# Patient Record
Sex: MALE | Race: BLACK OR AFRICAN AMERICAN | URBAN - METROPOLITAN AREA
[De-identification: names, ages, dates, MRNs, and addresses within clinical notes are randomized per-mention and may not be internally consistent; named-entity substitution may affect disease eponyms.]

---

## 2017-06-29 PROBLEM — 86406008 HUMAN IMMUNODEFICIENCY VIRUS INFECTION: Status: ACTIVE | Noted: 2017-06-29

## 2017-07-27 PROBLEM — 428283002 HISTORY OF POLYP OF COLON (SITUATION): Status: ACTIVE | Noted: 2017-07-27

## 2021-09-19 ENCOUNTER — TELEPHONE ENCOUNTER (OUTPATIENT)
Dept: URBAN - METROPOLITAN AREA CLINIC 92 | Facility: CLINIC | Age: 66
End: 2021-09-19

## 2021-09-19 NOTE — HPI-TODAY'S VISIT:
Patient is a 65-year-old male referred by  for evaluation of abnormal liver enzymes. A copy of the note will be sent to the referring provider. We did see records from September 13 from his local provider that he had some edema in his hands and had gone in for that.  BMI was noted to be 32.68 with a weight of 241 and a height of 72 inches.  Patient had mention some early satiety that they thought could be related to gastroparesis or fluid overload and they were getting a work-up of that.  Patient also noted to have diabetes with A1c running 8.5 and seeing a local endocrinologist for this.  He had some edema issues which he had been on furosemide but has been off of it for a week and so that was reordered.  He had elevated triglycerides that were downtrending from 208 to 179 with an LDL that was running 73 down from 121 on TriCor and atorvastatin for that.  This may be adding to the fatty liver.  Fatty liver also suspected as well.  He has chronic kidney disease stage III 8 with some estimated GFR in the 57 range and is avoiding nonsteroidals and has some proteinuria issues as well.   Recent laboratories listed included a glucose of 177 BUN of 14 creatinine 1.25 sodium 144 potassium 4.2 albumin 4.2 bilirubin 0.4 alkaline phosphatase 91 AST 42 and ALT 42.  Prior laboratories show glucose 159 BUN of 16 creatinine 1.35 sodium 142 potassium 4.6 AST 31 ALT 33 alk phos 59 total bili 0.6 W count 5.9 hemoglobin 13.7 plate count 135 which is diminished MCV 90.1 neutrophils 2605 lymphocytes 2561. March 2021 TSH 2.330 with works count 5.1 hemoglobin 14.2 plate count 148 MCV 95 vitamin D level was low at 23.4 cholesterol 141 triglycerides 205 HDL 35 LDL 72 AST was 46 ALT 49 alkaline phosphatase 82 bilirubin 0.3 Ultrasound done November 9, 2020 showed increased echogenicity of the liver consistent with fatty infiltration with exam limited by body habitus.  Pancreas not well seen due to gas.  No bile duct dilation.  Common bile duct not well seen kidneys with no hydronephrosis right kidney 10.9 and left 11.3. Recent labs showed magnesium 250 mg a day, diazepam 45 mg at night, atorvastatin 40 mg a day, losartan 25 mg a day, metoprolol 25 mg half a tablet orally in the evening, ferrous sulfate 28 mg once a day, Basaglar pen 48 units twice a day, Isentress 400 mg twice a day, aspirin 81 mils a day, Epzicom 1 1 daily, metoprolol 50 mg once a day, omega-3 fatty acids 500 mg twice a day, green source caplets twice a day, lansoprazole 30 mg once a day, duloxetine 60 mg twice a day, TriCor 145 minutes a day, furosemide 40 mg a day, Atrovent solution 2 sprays to the nares every 8 hours, levocetirizine 5 mg at night as needed, Ozempic 2 mg weekly, hydrocodone acetaminophen 5/325 1 every 12 hours as needed Benzonatate 100 mg at night as needed, Abilify 5 mg once a day. Past medical history includes the abdomen liver enzymes, fatty liver, type 2 diabetes, hyperlipidemia, mood disorder, GERD, vitamin D deficiency, sleep apnea, low testosterone syndrome, A. fib, peripheral disease, right hip osteoarthritis, thrombocytopenia.   Allergies MSG and shellfish 1. Abdomen enzymes noted and patient with prior November last year ultrasound limited suggesting fatty liver. Needs updated imaging as well as updated labs. 61 hep C antibody WBC other screens. Need to complete screens. Patient is working on diabetes but still with elevated A1c in the mid eight range. Patient also with weight issues as well. Patient also with hypertriglyceridemia as well as hyperlipidemia but apparently doing better on those which is good to note. Need to follow trends of his labs and see how doing. There did appear to be an increasing trend recently and is unclear if it is related to herbal medicine usage and that needs to be followed up on as well. Would encourage patient avoid any green tea or any immune stimulant herbals and then reassess course from there. Needs to continue to work on optimizing weight optimizing exercise 150 to 300 minutes a week as well as a treating all other potential causes of this. 2. Fatty liver noted by imaging. Certainly has risk factors for this with the hyperlipidemia, the weight, triglyceride issues, elevated A1c, and needs to continue to work on same. Need to get a better imaging but will start with ultrasound since he has not had one in a year and if not optimal get MRI and then reassess looking for fibrosis as another issue as well. Unclear why the numbers worsen some recently and need to rule out herbal role. 3. Hyperlipidemia on treatment as per team. Defer to primary care doctor. Apparently dropping. 4. Hypertriglyceridemia responding also to the fish oil and monitor accordingly. 5. Use of high-risk meds being monitored by local team. 6. Vaccine status needs recheck for hepatitis AMB immunity if negative consider for vaccination. He has been encouraged to have COVID-19 vaccine if not already obtained. 7. Vitamin D deficiency on supplementation as per team. 8. Low testosterone as per primary care team. 9. Peripheral artery disease as per team. Having lipid issues addressed. 10. Atrial fibrillation as per team on beta-blockers for same. 11. Mood disorder history noted as per team. Certainly duloxetine can increase liver enzymes in some patients and something to keep in mind. Certainly higher risk also for that particular if the patient has weight issues noted. 12. Diabetes needs to continue to optimize his hemoglobin A1c in the 8.5 range that needs to continue to be worked on accordingly. Plan 1.  _update labs. 2.  Check for hepatitis A/B immunity. 3.  _update imaging 4.  Encourage weight loss as well as exercise 150 to 300 minutes a week 5.  May need MRI pending ultrasound as was very limited last time. 6.  Avoid any green vitamins as they may potentially contain green tea which is potentially oral agent to raise liver enzyme

## 2021-09-29 ENCOUNTER — LAB OUTSIDE AN ENCOUNTER (OUTPATIENT)
Dept: URBAN - METROPOLITAN AREA TELEHEALTH 2 | Facility: TELEHEALTH | Age: 66
End: 2021-09-29

## 2021-09-29 ENCOUNTER — OFFICE VISIT (OUTPATIENT)
Dept: URBAN - METROPOLITAN AREA TELEHEALTH 2 | Facility: TELEHEALTH | Age: 66
End: 2021-09-29
Payer: OTHER GOVERNMENT

## 2021-09-29 ENCOUNTER — TELEPHONE ENCOUNTER (OUTPATIENT)
Dept: URBAN - METROPOLITAN AREA CLINIC 92 | Facility: CLINIC | Age: 66
End: 2021-09-29

## 2021-09-29 DIAGNOSIS — R74.8 ABNORMAL LIVER ENZYMES: ICD-10-CM

## 2021-09-29 DIAGNOSIS — Z79.899 HIGH RISK MEDICATION USE: ICD-10-CM

## 2021-09-29 DIAGNOSIS — I73.9 PERIPHERAL ARTERIAL DISEASE: ICD-10-CM

## 2021-09-29 DIAGNOSIS — E66.3 OVERWEIGHT: ICD-10-CM

## 2021-09-29 DIAGNOSIS — E78.1 HYPERTRIGLYCERIDEMIA: ICD-10-CM

## 2021-09-29 DIAGNOSIS — E55.9 VITAMIN D DEFICIENCY: ICD-10-CM

## 2021-09-29 DIAGNOSIS — E34.9 LOW TESTOSTERONE: ICD-10-CM

## 2021-09-29 DIAGNOSIS — Z71.89 VACCINE COUNSELING: ICD-10-CM

## 2021-09-29 DIAGNOSIS — E78.0 HYPERLIPIDEMIA: ICD-10-CM

## 2021-09-29 DIAGNOSIS — K76.0 FATTY LIVER: ICD-10-CM

## 2021-09-29 DIAGNOSIS — I48.91 ATRIAL FIBRILLATION: ICD-10-CM

## 2021-09-29 DIAGNOSIS — M16.9 HIP OSTEOARTHRITIS: ICD-10-CM

## 2021-09-29 PROBLEM — 238131007: Status: ACTIVE | Noted: 2021-09-19

## 2021-09-29 PROBLEM — 131078003 LOW TESTOSTERONE: Status: ACTIVE | Noted: 2021-09-19

## 2021-09-29 PROBLEM — 34713006 VITAMIN D DEFICIENCY: Status: ACTIVE | Noted: 2021-09-19

## 2021-09-29 PROBLEM — 55822004 HYPERLIPIDEMIA: Status: ACTIVE | Noted: 2021-09-19

## 2021-09-29 PROBLEM — 46206005 MOOD DISORDER: Status: ACTIVE | Noted: 2021-09-19

## 2021-09-29 PROBLEM — 313436004 TYPE II DIABETES MELLITUS WITHOUT COMPLICATION: Status: ACTIVE | Noted: 2021-09-19

## 2021-09-29 PROBLEM — 302870006 HYPERTRIGLYCERIDEMIA: Status: ACTIVE | Noted: 2021-09-19

## 2021-09-29 PROBLEM — 239872002 OSTEOARTHRITIS OF HIP: Status: ACTIVE | Noted: 2021-09-19

## 2021-09-29 PROBLEM — 49436004 ATRIAL FIBRILLATION: Status: ACTIVE | Noted: 2021-09-19

## 2021-09-29 PROBLEM — 840580004 PERIPHERAL ARTERIAL DISEASE: Status: ACTIVE | Noted: 2021-09-19

## 2021-09-29 PROCEDURE — 99204 OFFICE O/P NEW MOD 45 MIN: CPT

## 2021-09-29 RX ORDER — FENOFIBRATE 145 MG/1
1 TABLET TABLET ORAL ONCE A DAY
Status: ACTIVE | COMMUNITY

## 2021-09-29 RX ORDER — HYDROCODONE BITARTRATE AND ACETAMINOPHEN 5; 325 MG/1; MG/1
1 TABLET AS NEEDED TABLET ORAL QD PRN
Status: ACTIVE | COMMUNITY

## 2021-09-29 RX ORDER — SEMAGLUTIDE 1.34 MG/ML
AS DIRECTED INJECTION, SOLUTION SUBCUTANEOUS
Status: ACTIVE | COMMUNITY

## 2021-09-29 RX ORDER — RALTEGRAVIR 400 MG/1
1 TABLET TABLET, FILM COATED ORAL TWICE A DAY
Status: ACTIVE | COMMUNITY

## 2021-09-29 RX ORDER — LANSOPRAZOLE 30 MG/1
1 CAPSULE BEFORE A MEAL CAPSULE, DELAYED RELEASE ORAL ONCE A DAY
Status: ACTIVE | COMMUNITY

## 2021-09-29 RX ORDER — LEVOCETIRIZINE DIHYDROCHLORIDE 5 MG/1
1 TABLET IN THE EVENING TABLET, FILM COATED ORAL
Status: ACTIVE | COMMUNITY

## 2021-09-29 RX ORDER — PERPHENAZINE 2 MG
AS DIRECTED TABLET ORAL BID
Status: ACTIVE | COMMUNITY

## 2021-09-29 RX ORDER — MIRTAZAPINE 45 MG/1
1 TABLET AT BEDTIME TABLET, FILM COATED ORAL ONCE A DAY
Status: ACTIVE | COMMUNITY

## 2021-09-29 RX ORDER — ATORVASTATIN CALCIUM 40 MG/1
1 TABLET TABLET, FILM COATED ORAL ONCE A DAY
Status: ACTIVE | COMMUNITY

## 2021-09-29 RX ORDER — LOSARTAN POTASSIUM 25 MG/1
1 TABLET TABLET ORAL ONCE A DAY
Status: ACTIVE | COMMUNITY

## 2021-09-29 RX ORDER — PNV NO.95/FERROUS FUM/FOLIC AC 28MG-0.8MG
1 TABLET WITH A MEAL TABLET ORAL ONCE A DAY
Status: ACTIVE | COMMUNITY

## 2021-09-29 RX ORDER — ABACAVIR SULFATE AND LAMIVUDINE 600; 300 MG/1; MG/1
1 TABLET TABLET, FILM COATED ORAL ONCE A DAY
Status: ACTIVE | COMMUNITY

## 2021-09-29 RX ORDER — FUROSEMIDE 40 MG/1
1 TABLET TABLET ORAL ONCE A DAY
Status: ACTIVE | COMMUNITY

## 2021-09-29 RX ORDER — METOPROLOL SUCCINATE 25 MG/1
1 TABLET TABLET, FILM COATED, EXTENDED RELEASE ORAL ONCE A DAY
Status: ACTIVE | COMMUNITY

## 2021-09-29 RX ORDER — DULOXETINE 60 MG/1
1 CAPSULE CAPSULE, DELAYED RELEASE PELLETS ORAL TWICE A DAY
Status: ACTIVE | COMMUNITY

## 2021-09-29 RX ORDER — INSULIN GLARGINE 100 [IU]/ML
48 UNITS INJECTION, SOLUTION SUBCUTANEOUS BID
Status: ACTIVE | COMMUNITY

## 2021-09-29 NOTE — HPI-TODAY'S VISIT:
Patient is a 65-year-old male referred by  for evaluation of abnormal liver enzymesand fatty liver.  A copy of the note will be sent to the referring provider.  We did see records:  from September 13 from his local provider that he had some edema in his hands and had gone in for that.  BMI was noted to be 32.68 with a weight of 241 and a height of 72 inches.  Pt says this is his biggest weight in while and prior was 226 and up to this 241 with pandemic.  Patient had mention some early satiety that they thought could be related to gastroparesis or fluid overload and they were getting a work-up of that. He has not yet seen a Gi for that issue and he says that is not better.  Patient also noted to have diabetes with A1c running 8.5 and seeing a local endocrinologist for this.  He sees Dr Leona Adams and she works at [facility].  He had some edema issues which he had been on furosemide but has been off of it for a week and so that was reordered.  That helped the edema.  He had elevated triglycerides that were downtrending from 208 to 179 with an LDL that was running 73 down from 121 on TriCor and atorvastatin for that.  Lipids when off may be adding to the fatty liver.  Tx of the lipids can make that better.  Fatty liver also suspected as well and he says was 2 yrs.  He has chronic kidney disease stage III  and sees kidney team for this with some estimated GFR in the 57 range and is avoiding nonsteroidals and has some proteinuria issues as well.    Recent laboratories listed included a glucose of 177 BUN of 14 creatinine 1.25 sodium 144 potassium 4.2 albumin 4.2 bilirubin 0.4 alkaline phosphatase 91 AST 42 and ALT 42.  Ideal for him  is less than 35.  Prior laboratories show glucose 159 BUN of 16 creatinine 1.35 sodium 142 potassium 4.6 AST 31 ALT 33 alk phos 59 total bili 0.6 Wbc count 5.9 hemoglobin 13.7 plate count 135 which is diminished MCV 90.1 neutrophils 2605 lymphocytes 2561. That hints if he lost the weight that he would do better.  March 2021 TSH 2.330 with works count 5.1 hemoglobin 14.2 plate count 148 MCV 95 vitamin D level was low at 23.4 cholesterol 141 triglycerides 205 HDL 35 LDL 72 AST was 46 ALT 49 alkaline phosphatase 82 bilirubin 0.3 He does not recall why labs  worse then but was trying to lose some.  Ultrasound done November 9, 2020 showed increased echogenicity of the liver consistent with fatty infiltration with exam limited by body habitus.  Pancreas not well seen due to gas.  No bile duct dilation.  Common bile duct not well seen kidneys with no hydronephrosis right kidney 10.9 and left 11.3.  He lives near [facility].  Recent Meds showed magnesium 250 mg a day, mirtazepine 45 mg at night, atorvastatin 40 mg a day, losartan 25 mg a day, metoprolol 25 mg half a tablet orally in the evening, ferrous sulfate 28 mg once a day, Basaglar pen 48 units twice a day, Isentress 400 mg twice a day, aspirin 81 mils a day, Epzicom 1 1 daily, metoprolol 50 mg once a day, omega-3 fatty acids 500 mg twice a day, green source caplets twice a day, lansoprazole 30 mg once a day, duloxetine 60 mg twice a day, TriCor 145 minutes a day, furosemide 40 mg a day, Atrovent solution 2 sprays to the nares every 8 hours, levocetirizine 5 mg at night as needed, Ozempic 2 mg weekly, hydrocodone acetaminophen 5/325 1 every 12 hours as needed Benzonatate 100 mg at night as needed, Abilify 5 mg once a day.  I asked re the green source tablets and that is a supplement and he started to take for while.  Asked if any green tea in that and he does not know as that can raise lfts. he is off that for now.  Past medical history includes the abdomen liver enzymes, fatty liver, type 2 diabetes, hyperlipidemia, mood disorder, GERD, vitamin D deficiency, sleep apnea, low testosterone syndrome, A. fib, peripheral disease, right hip osteoarthritis, thrombocytopenia.   Allergies MSG and shellfish  Plan 1.  Need to update labs. Complete screens. 2.  Check for hepatitis A/B immunity. 3.  Need to update imaging with  u.s near [facility]. 4.  Encourage weight loss as working on this and as well as exercise 150 to 300 minutes a week. encouraged him to walk or exericise as can for 30 min, 5.  May need MRI pending ultrasound as was very limited last time. 6.  Avoid any green vitamins for now as many may potentially contain green tea which is potentially oral agent to raise liver enzyme.  Stressed to pt the need for social distancing and strict handwashing and wearing a mask and to follow any other new or added CDC recommendations as this is an evolving target.  Duration of the visit was 50 minutes with 20 minutes of chart prep and reviewing local records and then 30 min from [id number] am today by clock for this telemed visit as internet clock fluxed and with time spent reviewing recent records current status and future plans for the patient.

## 2021-10-04 ENCOUNTER — TELEPHONE ENCOUNTER (OUTPATIENT)
Dept: URBAN - METROPOLITAN AREA CLINIC 96 | Facility: CLINIC | Age: 66
End: 2021-10-04

## 2021-10-06 ENCOUNTER — LAB OUTSIDE AN ENCOUNTER (OUTPATIENT)
Dept: URBAN - METROPOLITAN AREA CLINIC 96 | Facility: CLINIC | Age: 66
End: 2021-10-06

## 2021-10-06 PROBLEM — 409063005 COUNSELING: Status: ACTIVE | Noted: 2021-09-19

## 2021-10-06 PROBLEM — 161646004 H/O: HIGH RISK MEDICATION: Status: ACTIVE | Noted: 2021-09-19

## 2021-10-06 PROBLEM — 302215000 THROMBOCYTOPENIA: Status: ACTIVE | Noted: 2021-09-19

## 2021-10-14 PROBLEM — 55822004 HYPERLIPIDAEMIA: Status: ACTIVE | Noted: 2021-10-14

## 2021-10-14 PROBLEM — 166643006 LIVER ENZYMES ABNORMAL: Status: ACTIVE | Noted: 2021-09-19

## 2021-10-14 PROBLEM — 197321007 FATTY LIVER: Status: ACTIVE | Noted: 2021-09-19

## 2021-11-12 ENCOUNTER — OFFICE VISIT (OUTPATIENT)
Dept: URBAN - METROPOLITAN AREA CLINIC 81 | Facility: CLINIC | Age: 66
End: 2021-11-12
Payer: OTHER GOVERNMENT

## 2021-11-12 DIAGNOSIS — K76.0 FATTY (CHANGE OF) LIVER: ICD-10-CM

## 2021-11-12 PROCEDURE — 76705 ECHO EXAM OF ABDOMEN: CPT

## 2021-11-12 PROCEDURE — 93975 VASCULAR STUDY: CPT

## 2021-11-16 ENCOUNTER — TELEPHONE ENCOUNTER (OUTPATIENT)
Dept: URBAN - METROPOLITAN AREA CLINIC 92 | Facility: CLINIC | Age: 66
End: 2021-11-16

## 2021-11-16 NOTE — HPI-TODAY'S VISIT:
Dillon Gonzalez, November 12 ultrasound shows the partially visualized pancreas to be unremarkable where seen. The liver has diffuse increased echogenicity compatible with fatty infiltration but no lesions seen. Gallbladder is thin-walled but without any stones or signs of inflammation.  Common bile duct was normal at 5 mm. Right kidney 11.2 cm with no hydronephrosis. Spleen normal at 9.6 cm. Liver vessels show normal flow. Overall they felt that you had a fatty appearing liver. Dr. Herrera

## 2022-01-24 ENCOUNTER — OFFICE VISIT (OUTPATIENT)
Dept: URBAN - METROPOLITAN AREA TELEHEALTH 2 | Facility: TELEHEALTH | Age: 67
End: 2022-01-24

## 2023-01-03 ENCOUNTER — TELEPHONE ENCOUNTER (OUTPATIENT)
Dept: URBAN - METROPOLITAN AREA CLINIC 92 | Facility: CLINIC | Age: 68
End: 2023-01-03

## 2023-01-03 ENCOUNTER — WEB ENCOUNTER (OUTPATIENT)
Dept: URBAN - METROPOLITAN AREA CLINIC 92 | Facility: CLINIC | Age: 68
End: 2023-01-03

## 2023-01-03 ENCOUNTER — OFFICE VISIT (OUTPATIENT)
Dept: URBAN - METROPOLITAN AREA CLINIC 92 | Facility: CLINIC | Age: 68
End: 2023-01-03
Payer: OTHER GOVERNMENT

## 2023-01-03 ENCOUNTER — LAB OUTSIDE AN ENCOUNTER (OUTPATIENT)
Dept: URBAN - METROPOLITAN AREA CLINIC 92 | Facility: CLINIC | Age: 68
End: 2023-01-03

## 2023-01-03 VITALS
BODY MASS INDEX: 32.29 KG/M2 | TEMPERATURE: 97.6 F | HEIGHT: 72 IN | SYSTOLIC BLOOD PRESSURE: 113 MMHG | HEART RATE: 70 BPM | DIASTOLIC BLOOD PRESSURE: 71 MMHG | WEIGHT: 238.4 LBS

## 2023-01-03 DIAGNOSIS — R19.7 DIARRHEA: ICD-10-CM

## 2023-01-03 DIAGNOSIS — R15.9 FULL INCONTINENCE OF FECES: ICD-10-CM

## 2023-01-03 PROBLEM — 72042002 INCONTINENCE OF FECES: Status: ACTIVE | Noted: 2017-06-29

## 2023-01-03 PROCEDURE — 99214 OFFICE O/P EST MOD 30 MIN: CPT | Performed by: INTERNAL MEDICINE

## 2023-01-03 RX ORDER — SEMAGLUTIDE 1.34 MG/ML
AS DIRECTED INJECTION, SOLUTION SUBCUTANEOUS
Status: ACTIVE | COMMUNITY

## 2023-01-03 RX ORDER — MIRTAZAPINE 45 MG/1
1 TABLET AT BEDTIME TABLET, FILM COATED ORAL ONCE A DAY
Status: ON HOLD | COMMUNITY

## 2023-01-03 RX ORDER — ATORVASTATIN CALCIUM 40 MG/1
1 TABLET TABLET, FILM COATED ORAL ONCE A DAY
Status: ACTIVE | COMMUNITY

## 2023-01-03 RX ORDER — FUROSEMIDE 40 MG/1
1 TABLET TABLET ORAL ONCE A DAY
Status: ON HOLD | COMMUNITY

## 2023-01-03 RX ORDER — METOPROLOL SUCCINATE 25 MG/1
1 TABLET TABLET, FILM COATED, EXTENDED RELEASE ORAL ONCE A DAY
Status: ACTIVE | COMMUNITY

## 2023-01-03 RX ORDER — RALTEGRAVIR 400 MG/1
1 TABLET TABLET, FILM COATED ORAL TWICE A DAY
Status: ON HOLD | COMMUNITY

## 2023-01-03 RX ORDER — HYDROCODONE BITARTRATE AND ACETAMINOPHEN 5; 325 MG/1; MG/1
1 TABLET AS NEEDED TABLET ORAL QD PRN
Status: ON HOLD | COMMUNITY

## 2023-01-03 RX ORDER — LEVOCETIRIZINE DIHYDROCHLORIDE 5 MG/1
1 TABLET IN THE EVENING TABLET, FILM COATED ORAL
Status: ACTIVE | COMMUNITY

## 2023-01-03 RX ORDER — SODIUM, POTASSIUM,MAG SULFATES 17.5-3.13G
177ML SOLUTION, RECONSTITUTED, ORAL ORAL
Qty: 2 | Refills: 0 | OUTPATIENT
Start: 2023-01-03 | End: 2023-01-05

## 2023-01-03 RX ORDER — LOSARTAN POTASSIUM 25 MG/1
1 TABLET TABLET ORAL ONCE A DAY
Status: ON HOLD | COMMUNITY

## 2023-01-03 RX ORDER — LANSOPRAZOLE 30 MG/1
1 CAPSULE BEFORE A MEAL CAPSULE, DELAYED RELEASE ORAL ONCE A DAY
Status: ON HOLD | COMMUNITY

## 2023-01-03 RX ORDER — PERPHENAZINE 2 MG
AS DIRECTED TABLET ORAL BID
Status: ACTIVE | COMMUNITY

## 2023-01-03 RX ORDER — PNV NO.95/FERROUS FUM/FOLIC AC 28MG-0.8MG
1 TABLET WITH A MEAL TABLET ORAL ONCE A DAY
Status: ACTIVE | COMMUNITY

## 2023-01-03 RX ORDER — INSULIN GLARGINE 100 [IU]/ML
48 UNITS INJECTION, SOLUTION SUBCUTANEOUS BID
Status: ACTIVE | COMMUNITY

## 2023-01-03 RX ORDER — DULOXETINE 60 MG/1
1 CAPSULE CAPSULE, DELAYED RELEASE PELLETS ORAL TWICE A DAY
Status: ON HOLD | COMMUNITY

## 2023-01-03 RX ORDER — FENOFIBRATE 145 MG/1
1 TABLET TABLET ORAL ONCE A DAY
Status: ON HOLD | COMMUNITY

## 2023-01-03 RX ORDER — ABACAVIR SULFATE AND LAMIVUDINE 600; 300 MG/1; MG/1
1 TABLET TABLET, FILM COATED ORAL ONCE A DAY
Status: ACTIVE | COMMUNITY

## 2023-01-03 NOTE — HPI-TODAY'S VISIT:
67-year-old male with a history of fatty ever, hyperlipidemia, atrial fibrillation, PAD, osteoarthritis, mood disorder, who presents to discuss acute on chronic diarrhea.  Sounds like he has had chronic diarrhea intermittently for several years, but it has worsened recently over the past 3 months or so.  Endorses 3-4 loose bowel movements per day, as well as occasional nocturnal stools and fecal incontinence.  Stool often contains undigested food.  Denies history of cholecystectomy or pancreatitis issues.  Takes Lomotil, as this is the only medicine that seems to help.  Stool studies with PCP reportedly normal.  Reports last colonoscopy 5 years ago, does not recall results.  Takes magnesium twice daily, for unclear indication.  Denies weight loss and blood in the stool.

## 2023-01-04 ENCOUNTER — LAB OUTSIDE AN ENCOUNTER (OUTPATIENT)
Dept: URBAN - METROPOLITAN AREA CLINIC 92 | Facility: CLINIC | Age: 68
End: 2023-01-04

## 2023-01-05 ENCOUNTER — CLAIMS CREATED FROM THE CLAIM WINDOW (OUTPATIENT)
Dept: URBAN - METROPOLITAN AREA SURGERY CENTER 16 | Facility: SURGERY CENTER | Age: 68
End: 2023-01-05
Payer: OTHER GOVERNMENT

## 2023-01-05 ENCOUNTER — CLAIMS CREATED FROM THE CLAIM WINDOW (OUTPATIENT)
Dept: URBAN - METROPOLITAN AREA CLINIC 4 | Facility: CLINIC | Age: 68
End: 2023-01-05
Payer: OTHER GOVERNMENT

## 2023-01-05 ENCOUNTER — CLAIMS CREATED FROM THE CLAIM WINDOW (OUTPATIENT)
Dept: URBAN - METROPOLITAN AREA SURGERY CENTER 16 | Facility: SURGERY CENTER | Age: 68
End: 2023-01-05

## 2023-01-05 DIAGNOSIS — D12.4 ADENOMA OF DESCENDING COLON: ICD-10-CM

## 2023-01-05 DIAGNOSIS — R19.7 ACUTE DIARRHEA: ICD-10-CM

## 2023-01-05 DIAGNOSIS — K63.89 OTHER SPECIFIED DISEASES OF INTESTINE: ICD-10-CM

## 2023-01-05 PROCEDURE — G8907 PT DOC NO EVENTS ON DISCHARG: HCPCS | Performed by: INTERNAL MEDICINE

## 2023-01-05 PROCEDURE — 45385 COLONOSCOPY W/LESION REMOVAL: CPT | Performed by: INTERNAL MEDICINE

## 2023-01-05 PROCEDURE — 45380 COLONOSCOPY AND BIOPSY: CPT | Performed by: INTERNAL MEDICINE

## 2023-01-05 PROCEDURE — 88305 TISSUE EXAM BY PATHOLOGIST: CPT | Performed by: PATHOLOGY

## 2023-01-05 RX ORDER — INSULIN GLARGINE 100 [IU]/ML
48 UNITS INJECTION, SOLUTION SUBCUTANEOUS BID
Status: ACTIVE | COMMUNITY

## 2023-01-05 RX ORDER — LOSARTAN POTASSIUM 25 MG/1
1 TABLET TABLET ORAL ONCE A DAY
Status: ON HOLD | COMMUNITY

## 2023-01-05 RX ORDER — ABACAVIR SULFATE AND LAMIVUDINE 600; 300 MG/1; MG/1
1 TABLET TABLET, FILM COATED ORAL ONCE A DAY
Status: ACTIVE | COMMUNITY

## 2023-01-05 RX ORDER — MIRTAZAPINE 45 MG/1
1 TABLET AT BEDTIME TABLET, FILM COATED ORAL ONCE A DAY
Status: ON HOLD | COMMUNITY

## 2023-01-05 RX ORDER — PERPHENAZINE 2 MG
AS DIRECTED TABLET ORAL BID
Status: ACTIVE | COMMUNITY

## 2023-01-05 RX ORDER — LEVOCETIRIZINE DIHYDROCHLORIDE 5 MG/1
1 TABLET IN THE EVENING TABLET, FILM COATED ORAL
Status: ACTIVE | COMMUNITY

## 2023-01-05 RX ORDER — ATORVASTATIN CALCIUM 40 MG/1
1 TABLET TABLET, FILM COATED ORAL ONCE A DAY
Status: ACTIVE | COMMUNITY

## 2023-01-05 RX ORDER — HYDROCODONE BITARTRATE AND ACETAMINOPHEN 5; 325 MG/1; MG/1
1 TABLET AS NEEDED TABLET ORAL QD PRN
Status: ON HOLD | COMMUNITY

## 2023-01-05 RX ORDER — METOPROLOL SUCCINATE 25 MG/1
1 TABLET TABLET, FILM COATED, EXTENDED RELEASE ORAL ONCE A DAY
Status: ACTIVE | COMMUNITY

## 2023-01-05 RX ORDER — DULOXETINE 60 MG/1
1 CAPSULE CAPSULE, DELAYED RELEASE PELLETS ORAL TWICE A DAY
Status: ON HOLD | COMMUNITY

## 2023-01-05 RX ORDER — RALTEGRAVIR 400 MG/1
1 TABLET TABLET, FILM COATED ORAL TWICE A DAY
Status: ON HOLD | COMMUNITY

## 2023-01-05 RX ORDER — SEMAGLUTIDE 1.34 MG/ML
AS DIRECTED INJECTION, SOLUTION SUBCUTANEOUS
Status: ACTIVE | COMMUNITY

## 2023-01-05 RX ORDER — FUROSEMIDE 40 MG/1
1 TABLET TABLET ORAL ONCE A DAY
Status: ON HOLD | COMMUNITY

## 2023-01-05 RX ORDER — SODIUM, POTASSIUM,MAG SULFATES 17.5-3.13G
177ML SOLUTION, RECONSTITUTED, ORAL ORAL
Qty: 2 | Refills: 0 | Status: ACTIVE | COMMUNITY
Start: 2023-01-03 | End: 2023-01-05

## 2023-01-05 RX ORDER — PNV NO.95/FERROUS FUM/FOLIC AC 28MG-0.8MG
1 TABLET WITH A MEAL TABLET ORAL ONCE A DAY
Status: ACTIVE | COMMUNITY

## 2023-01-05 RX ORDER — LANSOPRAZOLE 30 MG/1
1 CAPSULE BEFORE A MEAL CAPSULE, DELAYED RELEASE ORAL ONCE A DAY
Status: ON HOLD | COMMUNITY

## 2023-01-05 RX ORDER — FENOFIBRATE 145 MG/1
1 TABLET TABLET ORAL ONCE A DAY
Status: ON HOLD | COMMUNITY

## 2023-01-10 ENCOUNTER — TELEPHONE ENCOUNTER (OUTPATIENT)
Dept: URBAN - METROPOLITAN AREA CLINIC 92 | Facility: CLINIC | Age: 68
End: 2023-01-10

## 2023-01-10 LAB
C. DIFFICILE TOXIN A/B, STOOL - QDX: NEGATIVE
GASTROINTESTINAL PATHOGEN: (no result)

## 2023-05-02 ENCOUNTER — DASHBOARD ENCOUNTERS (OUTPATIENT)
Age: 68
End: 2023-05-02

## 2023-05-02 ENCOUNTER — LAB OUTSIDE AN ENCOUNTER (OUTPATIENT)
Dept: URBAN - METROPOLITAN AREA CLINIC 92 | Facility: CLINIC | Age: 68
End: 2023-05-02

## 2023-05-02 ENCOUNTER — OFFICE VISIT (OUTPATIENT)
Dept: URBAN - METROPOLITAN AREA CLINIC 92 | Facility: CLINIC | Age: 68
End: 2023-05-02
Payer: OTHER GOVERNMENT

## 2023-05-02 ENCOUNTER — WEB ENCOUNTER (OUTPATIENT)
Dept: URBAN - METROPOLITAN AREA CLINIC 92 | Facility: CLINIC | Age: 68
End: 2023-05-02

## 2023-05-02 ENCOUNTER — TELEPHONE ENCOUNTER (OUTPATIENT)
Dept: URBAN - METROPOLITAN AREA CLINIC 35 | Facility: CLINIC | Age: 68
End: 2023-05-02

## 2023-05-02 VITALS
HEART RATE: 65 BPM | HEIGHT: 72 IN | SYSTOLIC BLOOD PRESSURE: 150 MMHG | DIASTOLIC BLOOD PRESSURE: 82 MMHG | WEIGHT: 236 LBS | BODY MASS INDEX: 31.97 KG/M2 | TEMPERATURE: 97.3 F

## 2023-05-02 DIAGNOSIS — R19.7 DIARRHEA: ICD-10-CM

## 2023-05-02 DIAGNOSIS — R15.9 FULL INCONTINENCE OF FECES: ICD-10-CM

## 2023-05-02 DIAGNOSIS — Z86.010 PERSONAL HISTORY OF COLONIC POLYPS: ICD-10-CM

## 2023-05-02 PROCEDURE — 99215 OFFICE O/P EST HI 40 MIN: CPT | Performed by: INTERNAL MEDICINE

## 2023-05-02 RX ORDER — ABACAVIR SULFATE AND LAMIVUDINE 600; 300 MG/1; MG/1
1 TABLET TABLET, FILM COATED ORAL ONCE A DAY
Status: DISCONTINUED | COMMUNITY

## 2023-05-02 RX ORDER — DULOXETINE 60 MG/1
1 CAPSULE CAPSULE, DELAYED RELEASE PELLETS ORAL TWICE A DAY
Status: ON HOLD | COMMUNITY

## 2023-05-02 RX ORDER — POLYETHYLENE GLYCOL-3350 AND ELECTROLYTES WITH FLAVOR PACK 240; 5.84; 2.98; 6.72; 22.72 G/278.26G; G/278.26G; G/278.26G; G/278.26G; G/278.26G
4000 ML POWDER, FOR SOLUTION ORAL ONCE
Qty: 4000 ML | Refills: 0 | OUTPATIENT
Start: 2023-05-02

## 2023-05-02 RX ORDER — LOSARTAN POTASSIUM 25 MG/1
1 TABLET TABLET ORAL ONCE A DAY
Status: ON HOLD | COMMUNITY

## 2023-05-02 RX ORDER — ATORVASTATIN CALCIUM 40 MG/1
1 TABLET TABLET, FILM COATED ORAL ONCE A DAY
Status: ACTIVE | COMMUNITY

## 2023-05-02 RX ORDER — HYDROCODONE BITARTRATE AND ACETAMINOPHEN 5; 325 MG/1; MG/1
1 TABLET AS NEEDED TABLET ORAL QD PRN
Status: ON HOLD | COMMUNITY

## 2023-05-02 RX ORDER — PERPHENAZINE 2 MG
AS DIRECTED TABLET ORAL BID
Status: ACTIVE | COMMUNITY

## 2023-05-02 RX ORDER — INSULIN GLARGINE 100 [IU]/ML
48 UNITS INJECTION, SOLUTION SUBCUTANEOUS BID
Status: ACTIVE | COMMUNITY

## 2023-05-02 RX ORDER — FENOFIBRATE 145 MG/1
1 TABLET TABLET ORAL ONCE A DAY
Status: ON HOLD | COMMUNITY

## 2023-05-02 RX ORDER — LANSOPRAZOLE 30 MG/1
1 CAPSULE BEFORE A MEAL CAPSULE, DELAYED RELEASE ORAL ONCE A DAY
Status: ON HOLD | COMMUNITY

## 2023-05-02 RX ORDER — PNV NO.95/FERROUS FUM/FOLIC AC 28MG-0.8MG
1 TABLET WITH A MEAL TABLET ORAL ONCE A DAY
Status: DISCONTINUED | COMMUNITY

## 2023-05-02 RX ORDER — LEVOCETIRIZINE DIHYDROCHLORIDE 5 MG/1
1 TABLET IN THE EVENING TABLET, FILM COATED ORAL
Status: ACTIVE | COMMUNITY

## 2023-05-02 RX ORDER — RALTEGRAVIR 400 MG/1
1 TABLET TABLET, FILM COATED ORAL TWICE A DAY
Status: ON HOLD | COMMUNITY

## 2023-05-02 RX ORDER — FUROSEMIDE 40 MG/1
1 TABLET TABLET ORAL ONCE A DAY
Status: ON HOLD | COMMUNITY

## 2023-05-02 RX ORDER — METOPROLOL SUCCINATE 25 MG/1
1 TABLET TABLET, FILM COATED, EXTENDED RELEASE ORAL ONCE A DAY
Status: ACTIVE | COMMUNITY

## 2023-05-02 RX ORDER — MIRTAZAPINE 45 MG/1
1 TABLET AT BEDTIME TABLET, FILM COATED ORAL ONCE A DAY
Status: ON HOLD | COMMUNITY

## 2023-05-02 RX ORDER — SEMAGLUTIDE 1.34 MG/ML
AS DIRECTED INJECTION, SOLUTION SUBCUTANEOUS
Status: DISCONTINUED | COMMUNITY

## 2023-05-02 NOTE — HPI-TODAY'S VISIT:
67-year-old male with a history of fatty ever, hyperlipidemia, atrial fibrillation, PAD, osteoarthritis, mood disorder, who presents to discuss acute on chronic diarrhea.  Sounds like he has had chronic diarrhea intermittently for several years, but it has worsened recently over the past 3 months or so.  Endorses 3-4 loose bowel movements per day, as well as occasional nocturnal stools and fecal incontinence.  Stool often contains undigested food.  Denies history of cholecystectomy or pancreatitis issues.  Takes Lomotil, as this is the only medicine that seems to help.  Stool studies with PCP reportedly normal.  Reports last colonoscopy 5 years ago, does not recall results.  Takes magnesium twice daily, for unclear indication.  Denies weight loss and blood in the stool.  ***5/2/23: Colon 1/23 with TA, fair prep. Repeat in 6-12m recommended. Neg TI and colon bx. Notes chronic cough, and he feels like he chokes on water. No issues with solids. Barium swallow 3/23 with moderate GERD, small HH, very mild nonspecific motility d/o. States he has had a Bravo study in the past, but not clear if that is correct. Wife notes that he has no control over his BMs and has accidents. BMs are soft, about TID. No longer taking magnesium. Does not take a fiber supplement, but has tried it. FI is affecting is QoL. Uses Lomotil most days. Cough is exacerbated by laying down, worse when he lays on his right side. Pulm reportedly thinks his cough is 2/2 GERD.